# Patient Record
Sex: FEMALE | Race: BLACK OR AFRICAN AMERICAN | NOT HISPANIC OR LATINO | URBAN - METROPOLITAN AREA
[De-identification: names, ages, dates, MRNs, and addresses within clinical notes are randomized per-mention and may not be internally consistent; named-entity substitution may affect disease eponyms.]

---

## 2023-09-03 ENCOUNTER — HOSPITAL ENCOUNTER (EMERGENCY)
Facility: HOSPITAL | Age: 58
Discharge: HOME | End: 2023-09-04
Attending: STUDENT IN AN ORGANIZED HEALTH CARE EDUCATION/TRAINING PROGRAM

## 2023-09-03 DIAGNOSIS — E87.6 HYPOKALEMIA: ICD-10-CM

## 2023-09-03 DIAGNOSIS — F10.929 ALCOHOLIC INTOXICATION WITH COMPLICATION: Primary | ICD-10-CM

## 2023-09-03 DIAGNOSIS — R47.81 SLURRED SPEECH: ICD-10-CM
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
## 2023-09-03 DIAGNOSIS — R74.01 TRANSAMINITIS: ICD-10-CM

## 2023-09-03 PROBLEM — E66.9 OBESITY: Status: ACTIVE | Noted: 2022-04-29

## 2023-09-03 PROBLEM — I10 HYPERTENSION: Status: ACTIVE | Noted: 2022-04-29

## 2023-09-03 LAB
ALBUMIN SERPL BCP-MCNC: 3.5 G/DL (ref 3.5–5.2)
ALP SERPL-CCNC: 83 U/L (ref 55–135)
ALT SERPL W/O P-5'-P-CCNC: 61 U/L (ref 10–44)
AMPHET+METHAMPHET UR QL: NEGATIVE
ANION GAP SERPL CALC-SCNC: 12 MMOL/L (ref 8–16)
AST SERPL-CCNC: 67 U/L (ref 10–40)
BACTERIA #/AREA URNS AUTO: NORMAL /HPF
BARBITURATES UR QL SCN>200 NG/ML: NEGATIVE
BASOPHILS # BLD AUTO: 0.03 K/UL (ref 0–0.2)
BASOPHILS NFR BLD: 0.5 % (ref 0–1.9)
BENZODIAZ UR QL SCN>200 NG/ML: NEGATIVE
BILIRUB SERPL-MCNC: 0.6 MG/DL (ref 0.1–1)
BILIRUB UR QL STRIP: NEGATIVE
BUN SERPL-MCNC: 7 MG/DL (ref 6–20)
BZE UR QL SCN: NEGATIVE
CALCIUM SERPL-MCNC: 9 MG/DL (ref 8.7–10.5)
CANNABINOIDS UR QL SCN: NEGATIVE
CHLORIDE SERPL-SCNC: 99 MMOL/L (ref 95–110)
CHOLEST SERPL-MCNC: 127 MG/DL (ref 120–199)
CHOLEST/HDLC SERPL: 2 {RATIO} (ref 2–5)
CLARITY UR REFRACT.AUTO: ABNORMAL
CO2 SERPL-SCNC: 22 MMOL/L (ref 23–29)
COLOR UR AUTO: YELLOW
CREAT SERPL-MCNC: 0.7 MG/DL (ref 0.5–1.4)
CREAT SERPL-MCNC: 1 MG/DL (ref 0.5–1.4)
CREAT UR-MCNC: 44 MG/DL (ref 15–325)
DIFFERENTIAL METHOD: ABNORMAL
EOSINOPHIL # BLD AUTO: 0.1 K/UL (ref 0–0.5)
EOSINOPHIL NFR BLD: 2.2 % (ref 0–8)
ERYTHROCYTE [DISTWIDTH] IN BLOOD BY AUTOMATED COUNT: 13.1 % (ref 11.5–14.5)
EST. GFR  (NO RACE VARIABLE): >60 ML/MIN/1.73 M^2
ETHANOL SERPL-MCNC: 306 MG/DL
GLUCOSE SERPL-MCNC: 118 MG/DL (ref 70–110)
GLUCOSE UR QL STRIP: NEGATIVE
HCT VFR BLD AUTO: 42.7 % (ref 37–48.5)
HCV AB SERPL QL IA: REACTIVE
HDLC SERPL-MCNC: 63 MG/DL (ref 40–75)
HDLC SERPL: 49.6 % (ref 20–50)
HGB BLD-MCNC: 13.5 G/DL (ref 12–16)
HGB UR QL STRIP: ABNORMAL
HIV 1+2 AB+HIV1 P24 AG SERPL QL IA: NORMAL
IMM GRANULOCYTES # BLD AUTO: 0.02 K/UL (ref 0–0.04)
IMM GRANULOCYTES NFR BLD AUTO: 0.3 % (ref 0–0.5)
INR PPP: 1.1 (ref 0.8–1.2)
KETONES UR QL STRIP: NEGATIVE
LACTATE SERPL-SCNC: 2.4 MMOL/L (ref 0.5–2.2)
LDLC SERPL CALC-MCNC: 55.4 MG/DL (ref 63–159)
LEUKOCYTE ESTERASE UR QL STRIP: ABNORMAL
LYMPHOCYTES # BLD AUTO: 1.7 K/UL (ref 1–4.8)
LYMPHOCYTES NFR BLD: 29.1 % (ref 18–48)
MCH RBC QN AUTO: 29.8 PG (ref 27–31)
MCHC RBC AUTO-ENTMCNC: 31.6 G/DL (ref 32–36)
MCV RBC AUTO: 94 FL (ref 82–98)
METHADONE UR QL SCN>300 NG/ML: NEGATIVE
MICROSCOPIC COMMENT: NORMAL
MONOCYTES # BLD AUTO: 0.5 K/UL (ref 0.3–1)
MONOCYTES NFR BLD: 8.9 % (ref 4–15)
NEUTROPHILS # BLD AUTO: 3.5 K/UL (ref 1.8–7.7)
NEUTROPHILS NFR BLD: 59 % (ref 38–73)
NITRITE UR QL STRIP: NEGATIVE
NONHDLC SERPL-MCNC: 64 MG/DL
NRBC BLD-RTO: 0 /100 WBC
OPIATES UR QL SCN: NEGATIVE
PCP UR QL SCN>25 NG/ML: NEGATIVE
PH UR STRIP: 7 [PH] (ref 5–8)
PLATELET # BLD AUTO: 141 K/UL (ref 150–450)
PMV BLD AUTO: 10.6 FL (ref 9.2–12.9)
POC PTINR: 1 (ref 0.9–1.2)
POC PTWBT: 12.3 SEC (ref 9.7–14.3)
POTASSIUM SERPL-SCNC: 3.2 MMOL/L (ref 3.5–5.1)
PROT SERPL-MCNC: 7.7 G/DL (ref 6–8.4)
PROT UR QL STRIP: NEGATIVE
PROTHROMBIN TIME: 11.3 SEC (ref 9–12.5)
RBC # BLD AUTO: 4.53 M/UL (ref 4–5.4)
RBC #/AREA URNS AUTO: 3 /HPF (ref 0–4)
SAMPLE: NORMAL
SAMPLE: NORMAL
SODIUM SERPL-SCNC: 133 MMOL/L (ref 136–145)
SP GR UR STRIP: 1.01 (ref 1–1.03)
SQUAMOUS #/AREA URNS AUTO: 2 /HPF
TOXICOLOGY INFORMATION: NORMAL
TRIGL SERPL-MCNC: 43 MG/DL (ref 30–150)
TSH SERPL DL<=0.005 MIU/L-ACNC: 1.02 UIU/ML (ref 0.4–4)
URN SPEC COLLECT METH UR: ABNORMAL
WBC # BLD AUTO: 5.95 K/UL (ref 3.9–12.7)
WBC #/AREA URNS AUTO: 3 /HPF (ref 0–5)

## 2023-09-03 PROCEDURE — 82565 ASSAY OF CREATININE: CPT

## 2023-09-03 PROCEDURE — 81001 URINALYSIS AUTO W/SCOPE: CPT | Performed by: STUDENT IN AN ORGANIZED HEALTH CARE EDUCATION/TRAINING PROGRAM

## 2023-09-03 PROCEDURE — 80053 COMPREHEN METABOLIC PANEL: CPT | Performed by: STUDENT IN AN ORGANIZED HEALTH CARE EDUCATION/TRAINING PROGRAM

## 2023-09-03 PROCEDURE — 87522 HEPATITIS C REVRS TRNSCRPJ: CPT | Performed by: STUDENT IN AN ORGANIZED HEALTH CARE EDUCATION/TRAINING PROGRAM

## 2023-09-03 PROCEDURE — 87389 HIV-1 AG W/HIV-1&-2 AB AG IA: CPT | Performed by: STUDENT IN AN ORGANIZED HEALTH CARE EDUCATION/TRAINING PROGRAM

## 2023-09-03 PROCEDURE — 83605 ASSAY OF LACTIC ACID: CPT | Performed by: STUDENT IN AN ORGANIZED HEALTH CARE EDUCATION/TRAINING PROGRAM

## 2023-09-03 PROCEDURE — 93010 ELECTROCARDIOGRAM REPORT: CPT | Mod: ,,, | Performed by: INTERNAL MEDICINE

## 2023-09-03 PROCEDURE — 80048 BASIC METABOLIC PNL TOTAL CA: CPT | Mod: XB

## 2023-09-03 PROCEDURE — 82803 BLOOD GASES ANY COMBINATION: CPT

## 2023-09-03 PROCEDURE — 85025 COMPLETE CBC W/AUTO DIFF WBC: CPT | Performed by: STUDENT IN AN ORGANIZED HEALTH CARE EDUCATION/TRAINING PROGRAM

## 2023-09-03 PROCEDURE — 63600175 PHARM REV CODE 636 W HCPCS: Performed by: EMERGENCY MEDICINE

## 2023-09-03 PROCEDURE — 85610 PROTHROMBIN TIME: CPT

## 2023-09-03 PROCEDURE — 82077 ASSAY SPEC XCP UR&BREATH IA: CPT | Performed by: STUDENT IN AN ORGANIZED HEALTH CARE EDUCATION/TRAINING PROGRAM

## 2023-09-03 PROCEDURE — 96374 THER/PROPH/DIAG INJ IV PUSH: CPT

## 2023-09-03 PROCEDURE — 80307 DRUG TEST PRSMV CHEM ANLYZR: CPT | Performed by: STUDENT IN AN ORGANIZED HEALTH CARE EDUCATION/TRAINING PROGRAM

## 2023-09-03 PROCEDURE — 25000003 PHARM REV CODE 250: Performed by: EMERGENCY MEDICINE

## 2023-09-03 PROCEDURE — 93010 EKG 12-LEAD: ICD-10-PCS | Mod: ,,, | Performed by: INTERNAL MEDICINE

## 2023-09-03 PROCEDURE — 86803 HEPATITIS C AB TEST: CPT | Performed by: STUDENT IN AN ORGANIZED HEALTH CARE EDUCATION/TRAINING PROGRAM

## 2023-09-03 PROCEDURE — 99285 EMERGENCY DEPT VISIT HI MDM: CPT | Mod: 25

## 2023-09-03 PROCEDURE — 93005 ELECTROCARDIOGRAM TRACING: CPT

## 2023-09-03 PROCEDURE — 84443 ASSAY THYROID STIM HORMONE: CPT | Performed by: STUDENT IN AN ORGANIZED HEALTH CARE EDUCATION/TRAINING PROGRAM

## 2023-09-03 PROCEDURE — 99900035 HC TECH TIME PER 15 MIN (STAT)

## 2023-09-03 PROCEDURE — 85610 PROTHROMBIN TIME: CPT | Performed by: STUDENT IN AN ORGANIZED HEALTH CARE EDUCATION/TRAINING PROGRAM

## 2023-09-03 PROCEDURE — 25500020 PHARM REV CODE 255: Performed by: STUDENT IN AN ORGANIZED HEALTH CARE EDUCATION/TRAINING PROGRAM

## 2023-09-03 PROCEDURE — 80061 LIPID PANEL: CPT | Performed by: STUDENT IN AN ORGANIZED HEALTH CARE EDUCATION/TRAINING PROGRAM

## 2023-09-03 RX ORDER — TRIAMTERENE AND HYDROCHLOROTHIAZIDE 37.5; 25 MG/1; MG/1
1 CAPSULE ORAL DAILY
COMMUNITY
Start: 2023-08-04

## 2023-09-03 RX ORDER — DROPERIDOL 2.5 MG/ML
2.5 INJECTION, SOLUTION INTRAMUSCULAR; INTRAVENOUS
Status: COMPLETED | OUTPATIENT
Start: 2023-09-03 | End: 2023-09-03

## 2023-09-03 RX ADMIN — DROPERIDOL 2.5 MG: 2.5 INJECTION, SOLUTION INTRAMUSCULAR; INTRAVENOUS at 11:09

## 2023-09-03 RX ADMIN — IOHEXOL 75 ML: 350 INJECTION, SOLUTION INTRAVENOUS at 09:09

## 2023-09-03 RX ADMIN — POTASSIUM BICARBONATE 20 MEQ: 391 TABLET, EFFERVESCENT ORAL at 11:09

## 2023-09-04 VITALS
OXYGEN SATURATION: 100 % | WEIGHT: 250 LBS | SYSTOLIC BLOOD PRESSURE: 139 MMHG | HEART RATE: 87 BPM | RESPIRATION RATE: 18 BRPM | DIASTOLIC BLOOD PRESSURE: 80 MMHG | TEMPERATURE: 98 F

## 2023-09-04 PROBLEM — F10.929 ACUTE ALCOHOLIC INTOXICATION WITH COMPLICATION: Status: ACTIVE | Noted: 2023-09-04

## 2023-09-04 PROCEDURE — 99285 PR EMERGENCY DEPT VISIT,LEVEL V: ICD-10-PCS | Mod: FS,,, | Performed by: PSYCHIATRY & NEUROLOGY

## 2023-09-04 PROCEDURE — 99285 EMERGENCY DEPT VISIT HI MDM: CPT | Mod: FS,,, | Performed by: PSYCHIATRY & NEUROLOGY

## 2023-09-04 NOTE — CONSULTS
[facility] - Emergency Dept  Vascular Neurology  [facility]  Consult Note    Inpatient consult to Vascular (Stroke) Neurology  Consult performed by: Nette Woodard, NP  Consult ordered by: Girma Petersen MD        Assessment/Plan:     Patient is a 57 y.o. year old female with:    * Acute alcoholic intoxication with complication  56 y/o female with Ethanol level 306, came in with dysarthria and facial droop, CTA head and neck multiphase no LVO, MRI brain with no acute ischemic event seen. Patient symptoms are due to ethanol intoxication     on not drinking so much    Hypertension  normotensive    Obesity   on diet and exercise        STROKE DOCUMENTATION     Acute Stroke Times   Last Known Normal Date: 09/03/23  Last Known Normal Time: 2040  Symptom Onset Date: 09/03/20  Symptom Onset Time: 2040  Stroke Team Called Date: 09/03/20  Stroke Team Called Time: 2146  Stroke Team Arrival Date: 09/03/20  Stroke Team Arrival Time: 2154  Thrombolytic Therapy Recommended: No  CTA Interpretation Time: 2252  Thrombectomy Recommended: No    NIH Scale:  1a. Level of Consciousness: 0-->Alert, keenly responsive  1b. LOC Questions: 0-->Answers both questions correctly  1c. LOC Commands: 0-->Performs both tasks correctly  2. Best Gaze: 0-->Normal  3. Visual: 0-->No visual loss  4. Facial Palsy: 1-->Minor paralysis (flattened nasolabial fold, asymmetry on smiling)  5a. Motor Arm, Left: 0-->No drift, limb holds 90 (or 45) degrees for full 10 secs  5b. Motor Arm, Right: 0-->No drift, limb holds 90 (or 45) degrees for full 10 secs  6a. Motor Leg, Left: 0-->No drift, leg holds 30 degree position for full 5 secs  6b. Motor Leg, Right: 0-->No drift, leg holds 30 degree position for full 5 secs  7. Limb Ataxia: 0-->Absent  8. Sensory: 0-->Normal, no sensory loss  9. Best Language: 0-->No aphasia, normal  10. Dysarthria: 1-->Mild-to-moderate dysarthria, patient slurs at least some words and, at worst, can be  understood with some difficulty  11. Extinction and Inattention (formerly Neglect): 0-->No abnormality  Total (NIH Stroke Scale): 2    Modified [place] Score: 0  [place] Coma Scale:15   ABCD2 Score:    [id number] Score:   HAS -BLED Score:   ICH Score:   Hunt & Sal Classification:       Thrombolysis Candidate? No, Non-disabling symptoms - Low NIHSS , Strong suspicion for stroke mimic or alternative diagnosis     Delays to Thrombolysis?  Not Applicable    Interventional Revascularization Candidate?   Is the patient eligible for mechanical endovascular reperfusion (LEAH)?  No; no significant neurologic deficit (NIHSS <6)  and No; at this time symptoms not suggestive of large vessel occlusion    Delays to Thrombectomy? Not Applicable    Hemorrhagic change of an Ischemic Stroke: Does this patient have an ischemic stroke with hemorrhagic changes? No     Subjective:     History of Present Illness:  56 y/o female who was at friends house due to a death in the family and was drinking. She admits to a 32 oz Daiquiri. She fell and no one is aware if she she just passed out for tripped. Last known normal was 2040.   EMS brought patient to the ED. Unsure how long she was out of it.   Upon arrival patient is answering questions but does not remember what happened for her to be brought to the ED. She does admit to drinking.  Family states that she did drink a 32 oz daiquiri with multiple shots in it then and then when it was complete she put ice in the container and straight vodka.   Her NIHSS is 2 for facial droop and dysarthria.  Her ethanol level is 306  Tox screen was negative  CTA head and neck multiphase was no LVO seen  No thrombolytics due to low NIHSS plus ethanol level can explain symptoms.   Case discussed with Dr Valle Vascular neurology fellow and will get MRI brain           No past medical history on file.  No past surgical history on file.  No family history on file.     Review of patient's allergies  indicates:  No Known Allergies    Medications: I have reviewed the current medication administration record.    (Not in a hospital admission)      Review of Systems   Constitutional:  Negative for chills and fever.   HENT:  Negative for ear discharge and ear pain.    Eyes:  Negative for pain and redness.   Respiratory:  Negative for cough and shortness of breath.    Cardiovascular:  Negative for chest pain and leg swelling.   Gastrointestinal:  Negative for abdominal distention and abdominal pain.   Endocrine: Negative for polydipsia and polyphagia.   Genitourinary:  Negative for dyspareunia and dysuria.   Musculoskeletal:  Positive for arthralgias and back pain.   Skin:  Negative for rash and wound.   Neurological:  Positive for facial asymmetry and speech difficulty.   Hematological:  Negative for adenopathy.   Psychiatric/Behavioral:  Negative for agitation and confusion.      Objective:     Vital Signs (Most Recent):  Temp: 98.8 °F (37.1 °C) (09/03/23 2145)  Pulse: 95 (09/03/23 2300)  Resp: (!) 23 (09/03/23 2305)  BP: (!) 150/73 (09/03/23 2300)  SpO2: 98 % (09/03/23 2300)    Vital Signs Range (Last 24H):  Temp:  [98.8 °F (37.1 °C)]   Pulse:  []   Resp:  [17-64]   BP: (146-150)/(69-82)   SpO2:  [98 %-99 %]        Physical Exam  Vitals and nursing note reviewed.   Constitutional:       Appearance: Normal appearance. She is obese.   HENT:      Head: Normocephalic and atraumatic.   Eyes:      Extraocular Movements: Extraocular movements intact.      Conjunctiva/sclera: Conjunctivae normal.      Pupils: Pupils are equal, round, and reactive to light.   Cardiovascular:      Rate and Rhythm: Normal rate and regular rhythm.   Pulmonary:      Effort: Pulmonary effort is normal.      Breath sounds: Normal breath sounds.   Abdominal:      General: Abdomen is flat. Bowel sounds are normal.      Palpations: Abdomen is soft.   Musculoskeletal:         General: Normal range of motion.      Cervical back: Normal range of  "motion.   Skin:     General: Skin is warm and dry.   Neurological:      Mental Status: She is alert.      Comments: Facial droop, dysarthria              Neurological Exam:   LOC: alert  Attention Span: Good   Language: No aphasia  Articulation: Dysarthria  Orientation: Person, Place, Time   Visual Fields: Full  EOM (CN III, IV, VI): Full/intact  Pupils (CN II, III): PERRL  Facial Sensation (CN V): Normal  Facial Movement (CN VII): Lower facial weakness on the Left  Gag Reflex: present  Reflexes: flexor plantar responses bilaterally  Motor: Arm left  Normal 5/5  Leg left  Normal 5/5  Arm right  Normal 5/5  Leg right Normal 5/5  Cerebellum: No evidence of appendicular or axial ataxia  Sensation: Intact to light touch, temperature and vibration  Tone: Normal tone throughout      Laboratory:  CMP:   Recent Labs   Lab 09/03/23  2215   CALCIUM 9.0   ALBUMIN 3.5   PROT 7.7   *   K 3.2*   CO2 22*   CL 99   BUN 7   CREATININE 0.7   ALKPHOS 83   ALT 61*   AST 67*   BILITOT 0.6     CBC:   Recent Labs   Lab 09/03/23  2215   WBC 5.95   RBC 4.53   HGB 13.5   HCT 42.7   *   MCV 94   MCH 29.8   MCHC 31.6*     Lipid Panel:   Recent Labs   Lab 09/03/23  2215   CHOL 127   LDLCALC 55.4*   HDL 63   TRIG 43     Coagulation:   Recent Labs   Lab 09/03/23  2250   INR 1.0     Hgb A1C: No results for input(s): "HGBA1C" in the last 168 hours.  TSH:   Recent Labs   Lab 09/03/23  2215   TSH 1.021       Diagnostic Results:      Brain imaging:  MRI Brain w/o contrast 9-4-23 results:  No acute intracranial abnormality, specifically no acute hemorrhage or major vascular distribution infarct.    Vessel Imaging:  CTA head dn neck multiphase 9-3-23 results:  No acute intracranial abnormality, specifically no high-grade stenosis or major vessel occlusion.  No acute hemorrhage.     Advanced degenerative changes in the cervical spine with possible erosions.  Outpatient follow-up with MRI of the cervical spine, as clinically " warranted    Cardiac Evaluation:   EKG 9-3-23 results:  Sinus tachycardia Nonspecific T wave abnormality Abnormal ECG No previous ECGs available        Nette Woodard NP  [facility]  Department of Vascular Neurology   [facility] Dept

## 2023-09-04 NOTE — ED NOTES
Patient observed in doorway, she removed EMS IV line and monitoring.  Charge nurse attempted to re-direct, patient became upset.  Nurse able to redirect patient to restroom with supervision and return to bed without incident.  Daughter and mother bedside.

## 2023-09-04 NOTE — DISCHARGE INSTRUCTIONS
You were intoxicated with alcohol today.  Please read the provided information about alcohol use.  You were found to have elevated liver enzymes, this must be followed up by your doctor.  Your potassium was slightly low.  Please take a multivitamin daily.    Tests today showed:   Labs Reviewed   CBC W/ AUTO DIFFERENTIAL - Abnormal; Notable for the following components:       Result Value    MCHC 31.6 (*)     Platelets 141 (*)     All other components within normal limits    Narrative:     Release to patient->Immediate   COMPREHENSIVE METABOLIC PANEL - Abnormal; Notable for the following components:    Sodium 133 (*)     Potassium 3.2 (*)     CO2 22 (*)     Glucose 118 (*)     AST 67 (*)     ALT 61 (*)     All other components within normal limits    Narrative:     Release to patient->Immediate   LIPID PANEL - Abnormal; Notable for the following components:    LDL Cholesterol 55.4 (*)     All other components within normal limits    Narrative:     Release to patient->Immediate   ALCOHOL,MEDICAL (ETHANOL) - Abnormal; Notable for the following components:    Alcohol, Serum 306 (*)     All other components within normal limits    Narrative:     Release to patient->Immediate   URINALYSIS, REFLEX TO URINE CULTURE - Abnormal; Notable for the following components:    Appearance, UA Hazy (*)     Occult Blood UA 2+ (*)     Leukocytes, UA Trace (*)     All other components within normal limits    Narrative:     Specimen Source->Urine   HEPATITIS C ANTIBODY - Abnormal; Notable for the following components:    Hepatitis C Ab Reactive (*)     All other components within normal limits    Narrative:     Release to patient->Immediate   LACTIC ACID, PLASMA - Abnormal; Notable for the following components:    Lactate (Lactic Acid) 2.4 (*)     All other components within normal limits    Narrative:     Release to patient->Immediate   PROTIME-INR    Narrative:     Release to patient->Immediate   TSH    Narrative:     Release to  patient->Immediate   DRUG SCREEN PANEL, URINE EMERGENCY    Narrative:     Specimen Source->Urine   HIV 1 / 2 ANTIBODY    Narrative:     Release to patient->Immediate   URINALYSIS MICROSCOPIC    Narrative:     Specimen Source->Urine   HEPATITIS C RNA, QUANTITATIVE, PCR   POCT GLUCOSE, HAND-HELD DEVICE   ISTAT PROCEDURE   ISTAT CREATININE     MRI Brain Without Contrast   Final Result      No acute intracranial abnormality, specifically no acute hemorrhage or major vascular distribution infarct.      Electronically signed by resident: Liz Carmona   Date:    09/04/2023   Time:    01:05      Electronically signed by: Jordan Etienne   Date:    09/04/2023   Time:    02:04      CTA STROKE MULTI-PHASE   Final Result      No acute intracranial abnormality, specifically no high-grade stenosis or major vessel occlusion.  No acute hemorrhage.      Advanced degenerative changes in the cervical spine with possible erosions.  Outpatient follow-up with MRI of the cervical spine, as clinically warranted.      Additional findings as above.      Electronically signed by resident: Liz Carmona   Date:    09/03/2023   Time:    22:52      Electronically signed by: Mack Linares MD   Date:    09/03/2023   Time:    23:27          Treatments you had today:   Medications   iohexoL (OMNIPAQUE 350) injection 75 mL (75 mLs Intravenous Given 9/3/23 2159)   potassium bicarbonate disintegrating tablet 20 mEq (20 mEq Oral Given 9/3/23 2315)   droPERidol injection 2.5 mg (2.5 mg Intravenous Given 9/3/23 2332)       Follow-Up Plan:  - Follow-up with primary care doctor within 3 - 5 days  - Additional testing and/or evaluation as directed by your primary doctor    Return to the Emergency Department for symptoms including but not limited to: worsening symptoms, shortness of breath or chest pain, vomiting with inability to hold down fluids, fevers greater than 100.4°F, passing out/fainting/unconsciousness, or other concerning symptoms.

## 2023-09-04 NOTE — PROVIDER PROGRESS NOTES - EMERGENCY DEPT.
Signout Note    I received signout from the previous provider.     Chief complaint:  Fall (From home after unwitnessed fall around 2040. Arrives with L sided facial droop and slurred speech. No unilateral weakness. +LOC. +ETOH. Pt states had 1 mixed drink PTA)      Pertinent history &exam:  Nabil Perez is a 57 y.o. female with pertinent PMH of hypertension who presented to emergency department with complaint of slurred speech.  Also with fall, left-sided facial droop, loss of consciousness.  She apparently had 1 alcoholic beverage prior to arrival.      Stroke code was activated by previous physician.  She was signed out pending remainder of workup including labs, CTA head, vascular Neurology recommendations for final disposition.    Vitals:    09/03/23 2305   BP:    Pulse:    Resp: (!) 23   Temp:        Imaging Studies:    MRI Brain Without Contrast   Final Result      No acute intracranial abnormality, specifically no acute hemorrhage or major vascular distribution infarct.      Electronically signed by resident: Liz Carmona   Date:    09/04/2023   Time:    01:05      Electronically signed by: Jordan Etienne   Date:    09/04/2023   Time:    02:04      CTA STROKE MULTI-PHASE   Final Result      No acute intracranial abnormality, specifically no high-grade stenosis or major vessel occlusion.  No acute hemorrhage.      Advanced degenerative changes in the cervical spine with possible erosions.  Outpatient follow-up with MRI of the cervical spine, as clinically warranted.      Additional findings as above.      Electronically signed by resident: Liz Carmona   Date:    09/03/2023   Time:    22:52      Electronically signed by: Mack Linares MD   Date:    09/03/2023   Time:    23:27          Medications Given:  Medications   iohexoL (OMNIPAQUE 350) injection 75 mL (75 mLs Intravenous Given 9/3/23 4549)   potassium bicarbonate disintegrating tablet 20 mEq (20 mEq Oral Given 9/3/23 2315)   droPERidol injection 2.5  "mg (2.5 mg Intravenous Given 9/3/23 8536)       Pending Items/ MDM:  57 y.o. female with above complaint.  CTA head and neck without large vessel occlusion or other acute abnormality.  Labs remarkable for significantly elevated serum ethanol level, transaminitis, and mild hypokalemia which we will replete.  Not candidate for TNK per vascular Neurology.  They are requesting MRI.    11:12 PM  By nursing staff that patient is refusing the MRI.  patient is severely intoxicated.  When I attempted to interview her, she stated why are you talking to me hard?" will give droperidol to obtain MRI.  Family at bedside updated.    2:20 AM  MRI without acute stroke or other acute abnormality.  Patient reassessed, she is clinically sober, calm, cooperative, follows commands.  Family member at bedside comfortable taking patient home.  Discharged home in stable condition.  Return precautions discussed at bedside.    Diagnostic Impression:    1. Alcoholic intoxication with complication    2. Slurred speech    3. Fall, initial encounter    4. Transaminitis    5. Hypokalemia         ED Disposition Condition    Discharge Stable          ED Prescriptions    None       Follow-up Information       Follow up With Specialties Details Why Contact Info    Your primary care doctor  Schedule an appointment as soon as possible for a visit               Family understands the plan and is in agreement, verbalized understanding, questions answered    Nikki Greer MD  Emergency Medicine      "

## 2023-09-04 NOTE — ED PROVIDER NOTES
Encounter Date: 9/3/2023       History     Chief Complaint   Patient presents with    Fall     From home after unwitnessed fall around 2040. Arrives with L sided facial droop and slurred speech. No unilateral weakness. +LOC. +ETOH. Pt states had 1 mixed drink PTA     57-year-old female with unknown PMH presents with slurred speech. She states she is not sure why she is here.  She states she only had 1 drink.  Patient does not remember the fall and is unable to report if she had any prodromal symptoms.  She is unable to state the mechanism of the event. Denies any symptoms currently.     Per EMS, family stated the patient fell around 2040.  They noticed afterwards that she had slurred speech but do not think it was present before the fall.  They also reported left-sided facial droop.  They report mild alcohol use.  They report the family is confident that the slurred speech and facial droop for new.  No history of seizures.    The history is provided by the patient and the EMS personnel. The history is limited by the condition of the patient.     Review of patient's allergies indicates:  No Known Allergies  No past medical history on file.  No past surgical history on file.  No family history on file.     Review of Systems    Physical Exam     Initial Vitals [09/03/23 2145]   BP Pulse Resp Temp SpO2   (!) 146/82 106 17 98.8 °F (37.1 °C) 98 %      MAP       --         Physical Exam    Nursing note and vitals reviewed.  Constitutional: She appears well-developed and well-nourished. She is not diaphoretic. No distress.   HENT:   Head: Normocephalic and atraumatic.   Slight blood around mouth. No tongue laceration   Eyes: Conjunctivae and EOM are normal. Pupils are equal, round, and reactive to light.   Neck: Neck supple.   No midline tenderness to palpation   Normal range of motion.  Cardiovascular:  Regular rhythm, normal heart sounds and intact distal pulses.           No murmur heard.  Tachycardic   Pulmonary/Chest:  Breath sounds normal. No respiratory distress. She has no wheezes. She has no rhonchi. She has no rales.   Abdominal: Abdomen is soft. She exhibits no distension. There is no abdominal tenderness. There is no rebound and no guarding.   Musculoskeletal:         General: No tenderness or edema.      Cervical back: Normal range of motion and neck supple.      Comments: No cervical spine tenderness to palpation.    Back:  No midline tenderness palpation, step-offs, or deformities     Pelvis: Stable     Neurological: She is alert and oriented to person, place, and time.   NIHSS ([facility] [place] [facility] Stroke Scale)   1a  Level of consciousness: 0=alert; keenly responsive  1b. LOC questions:  0 = Answers both questions correctly  1c. LOC commands: 0=Answers both tasks correctly  2.  Best Gaze: 0=normal  3. Visual: 0=No visual loss  4. Facial Palsy: 1=Minor paralysis (flattened nasolabial fold, asymmetric on smiling)  5a. Motor left arm: 0=No drift, limb holds 90 (or 45) degrees for full 10 seconds  5b.  Motor right arm: 0=No drift, limb holds 90 (or 45) degrees for full 10 seconds  6a. motor left le=No drift, limb holds 90 (or 45) degrees for full 10 seconds  6b  Motor right le=No drift, limb holds 90 (or 45) degrees for full 10 seconds  7. Limb Ataxia: 0=Absent  8.  Sensory: 0=Normal; no sensory loss  9. Best Language:  1=Mild to moderate aphasia; some obvious loss of fluency or facility of comprehension without significant limitation on ideas expressed or form of expression.  10. Dysarthria: 1=Mild to moderate, patient slurs at least some words and at worst, can be understood with some difficulty  11. Extinction and Inattention: 0=No abnormality       Total:   3         Skin: Skin is warm and dry. Capillary refill takes less than 2 seconds.         ED Course   Procedures  Labs Reviewed   CBC W/ AUTO DIFFERENTIAL   COMPREHENSIVE METABOLIC PANEL   PROTIME-INR   TSH   LIPID PANEL   ALCOHOL,MEDICAL (ETHANOL)    URINALYSIS, REFLEX TO URINE CULTURE   DRUG SCREEN PANEL, URINE EMERGENCY   HIV 1 / 2 ANTIBODY   HEPATITIS C ANTIBODY   LACTIC ACID, PLASMA   POCT GLUCOSE, HAND-HELD DEVICE          Imaging Results    None          Medications - No data to display  Medical Decision Making  Hemodynamically stable 37-year-old female presents with slurred speech and left-sided facial droop in addition to a recent fall around 8:40 p.m. with an unclear mechanism or surrounding symptoms.  Given the patient's acute onset slurred speech and left-sided facial droop, stroke code activated and vascular neurology consulted. Pt care will be handed off to oncoming team at 2200, entire workup pending. See ED provider progress note for other documentation.     Amount and/or Complexity of Data Reviewed  Independent Historian: EMS     Details: See HPI  Labs: ordered.  Radiology: ordered.  ECG/medicine tests: ordered.                               Clinical Impression:   Final diagnoses:  [R47.81] Slurred speech (Primary)  [W19.XXXA] Fall, initial encounter               Girma Petersen MD  09/03/23 4081

## 2023-09-04 NOTE — ASSESSMENT & PLAN NOTE
58 y/o female with Ethanol level 306, came in with dysarthria and facial droop, CTA head and neck multiphase no LVO, MRI brain with no acute ischemic event seen. Patient symptoms are due to ethanol intoxication     on not drinking so much

## 2023-09-04 NOTE — ED TRIAGE NOTES
Nabil Perez, a 57 y.o. female presents to the ED w/ complaint of fall, after being by a friends house drinking.  Daughter states patient seemed to just stiffen up and fall.  Patient presents with a bloody mouth from fall, extremely tearful, confused, with slurred speech.  Patient repeats the same statements, AAOx2(person and time).  Patient has family bedside.    Triage note:  Chief Complaint   Patient presents with    Fall     From home after unwitnessed fall around 2040. Arrives with L sided facial droop and slurred speech. No unilateral weakness. +LOC. +ETOH. Pt states had 1 mixed drink PTA     Review of patient's allergies indicates:  No Known Allergies  No past medical history on file.    Patient identifiers for Nabil Perez checked and correct.    LOC: The patient AAO x2 to person and time, she is extremely tearful and confused and has slurred speech.    APPEARANCE: Patient extremely anxious.    SKIN: The skin is warm and dry, color consistent with ethnicity, patient has normal skin turgor and moist mucus membranes, skin intact, patient with abrasions to mouth and nose from fall.    MUSCULOSKELETAL: Patient moving all extremities well, no obvious swelling or deformities noted.    RESPIRATORY: Airway is open and patent, respirations are spontaneous and even, patient has a normal effort and rate.    CARDIAC: Patient is tachycardic.     ABDOMEN: Soft and non tender to palpation, no distention noted. Patient denies any nausea, vomiting, diarrhea, or constipation.     NEUROLOGIC: Eyes open spontaneously, PERRL, behavior appropriate to situation, follows commands, facial expression symmetrical, bilateral hand grasp equal and even, purposeful motor response noted, normal sensation in all extremities.     HEENT: No abnormalities noted. White sclera and pupils equal round and reactive to light. Denies headache, dizziness.     : Pt voids independently, denies dysuria, hematuria, frequency.

## 2023-09-04 NOTE — HPI
58 y/o female who was at friends house due to a death in the family and was drinking. She admits to a 32 oz Daiquiri. She fell and no one is aware if she she just passed out for tripped. Last known normal was 2040.   EMS brought patient to the ED. Unsure how long she was out of it.   Upon arrival patient is answering questions but does not remember what happened for her to be brought to the ED. She does admit to drinking.  Family states that she did drink a 32 oz daiquiri with multiple shots in it then and then when it was complete she put ice in the container and straight vodka.   Her NIHSS is 2 for facial droop and dysarthria.  Her ethanol level is 306  Tox screen was negative  CTA head and neck multiphase was no LVO seen  No thrombolytics due to low NIHSS plus ethanol level can explain symptoms.   Case discussed with Dr Valle Vascular neurology fellow and will get MRI brain

## 2023-09-04 NOTE — SUBJECTIVE & OBJECTIVE
No past medical history on file.  No past surgical history on file.  No family history on file.     Review of patient's allergies indicates:  No Known Allergies    Medications: I have reviewed the current medication administration record.    (Not in a hospital admission)      Review of Systems   Constitutional:  Negative for chills and fever.   HENT:  Negative for ear discharge and ear pain.    Eyes:  Negative for pain and redness.   Respiratory:  Negative for cough and shortness of breath.    Cardiovascular:  Negative for chest pain and leg swelling.   Gastrointestinal:  Negative for abdominal distention and abdominal pain.   Endocrine: Negative for polydipsia and polyphagia.   Genitourinary:  Negative for dyspareunia and dysuria.   Musculoskeletal:  Positive for arthralgias and back pain.   Skin:  Negative for rash and wound.   Neurological:  Positive for facial asymmetry and speech difficulty.   Hematological:  Negative for adenopathy.   Psychiatric/Behavioral:  Negative for agitation and confusion.      Objective:     Vital Signs (Most Recent):  Temp: 98.8 °F (37.1 °C) (09/03/23 2145)  Pulse: 95 (09/03/23 2300)  Resp: (!) 23 (09/03/23 2305)  BP: (!) 150/73 (09/03/23 2300)  SpO2: 98 % (09/03/23 2300)    Vital Signs Range (Last 24H):  Temp:  [98.8 °F (37.1 °C)]   Pulse:  []   Resp:  [17-64]   BP: (146-150)/(69-82)   SpO2:  [98 %-99 %]        Physical Exam  Vitals and nursing note reviewed.   Constitutional:       Appearance: Normal appearance. She is obese.   HENT:      Head: Normocephalic and atraumatic.   Eyes:      Extraocular Movements: Extraocular movements intact.      Conjunctiva/sclera: Conjunctivae normal.      Pupils: Pupils are equal, round, and reactive to light.   Cardiovascular:      Rate and Rhythm: Normal rate and regular rhythm.   Pulmonary:      Effort: Pulmonary effort is normal.      Breath sounds: Normal breath sounds.   Abdominal:      General: Abdomen is flat. Bowel sounds are  "normal.      Palpations: Abdomen is soft.   Musculoskeletal:         General: Normal range of motion.      Cervical back: Normal range of motion.   Skin:     General: Skin is warm and dry.   Neurological:      Mental Status: She is alert.      Comments: Facial droop, dysarthria              Neurological Exam:   LOC: alert  Attention Span: Good   Language: No aphasia  Articulation: Dysarthria  Orientation: Person, Place, Time   Visual Fields: Full  EOM (CN III, IV, VI): Full/intact  Pupils (CN II, III): PERRL  Facial Sensation (CN V): Normal  Facial Movement (CN VII): Lower facial weakness on the Left  Gag Reflex: present  Reflexes: flexor plantar responses bilaterally  Motor: Arm left  Normal 5/5  Leg left  Normal 5/5  Arm right  Normal 5/5  Leg right Normal 5/5  Cerebellum: No evidence of appendicular or axial ataxia  Sensation: Intact to light touch, temperature and vibration  Tone: Normal tone throughout      Laboratory:  CMP:   Recent Labs   Lab 09/03/23  2215   CALCIUM 9.0   ALBUMIN 3.5   PROT 7.7   *   K 3.2*   CO2 22*   CL 99   BUN 7   CREATININE 0.7   ALKPHOS 83   ALT 61*   AST 67*   BILITOT 0.6     CBC:   Recent Labs   Lab 09/03/23  2215   WBC 5.95   RBC 4.53   HGB 13.5   HCT 42.7   *   MCV 94   MCH 29.8   MCHC 31.6*     Lipid Panel:   Recent Labs   Lab 09/03/23  2215   CHOL 127   LDLCALC 55.4*   HDL 63   TRIG 43     Coagulation:   Recent Labs   Lab 09/03/23  2250   INR 1.0     Hgb A1C: No results for input(s): "HGBA1C" in the last 168 hours.  TSH:   Recent Labs   Lab 09/03/23  2215   TSH 1.021       Diagnostic Results:      Brain imaging:  MRI Brain w/o contrast 9-4-23 results:  No acute intracranial abnormality, specifically no acute hemorrhage or major vascular distribution infarct.    Vessel Imaging:  CTA head dn neck multiphase 9-3-23 results:  No acute intracranial abnormality, specifically no high-grade stenosis or major vessel occlusion.  No acute hemorrhage.     Advanced degenerative " changes in the cervical spine with possible erosions.  Outpatient follow-up with MRI of the cervical spine, as clinically warranted    Cardiac Evaluation:   EKG 9-3-23 results:  Sinus tachycardia Nonspecific T wave abnormality Abnormal ECG No previous ECGs available

## 2023-09-06 LAB
HCV RNA SERPL QL NAA+PROBE: NOT DETECTED
HCV RNA SPEC NAA+PROBE-ACNC: NOT DETECTED IU/ML